# Patient Record
Sex: FEMALE | Race: OTHER
[De-identification: names, ages, dates, MRNs, and addresses within clinical notes are randomized per-mention and may not be internally consistent; named-entity substitution may affect disease eponyms.]

---

## 2018-04-18 ENCOUNTER — HOSPITAL ENCOUNTER (INPATIENT)
Dept: HOSPITAL 72 - EMR | Age: 83
LOS: 2 days | Discharge: HOME | DRG: 206 | End: 2018-04-20
Payer: MEDICARE

## 2018-04-18 VITALS — DIASTOLIC BLOOD PRESSURE: 68 MMHG | SYSTOLIC BLOOD PRESSURE: 139 MMHG

## 2018-04-18 VITALS — SYSTOLIC BLOOD PRESSURE: 142 MMHG | DIASTOLIC BLOOD PRESSURE: 65 MMHG

## 2018-04-18 VITALS — BODY MASS INDEX: 25.99 KG/M2 | WEIGHT: 156 LBS | HEIGHT: 65 IN

## 2018-04-18 VITALS — DIASTOLIC BLOOD PRESSURE: 50 MMHG | SYSTOLIC BLOOD PRESSURE: 143 MMHG

## 2018-04-18 VITALS — DIASTOLIC BLOOD PRESSURE: 63 MMHG | SYSTOLIC BLOOD PRESSURE: 135 MMHG

## 2018-04-18 VITALS — DIASTOLIC BLOOD PRESSURE: 67 MMHG | SYSTOLIC BLOOD PRESSURE: 139 MMHG

## 2018-04-18 DIAGNOSIS — Z88.6: ICD-10-CM

## 2018-04-18 DIAGNOSIS — E66.9: ICD-10-CM

## 2018-04-18 DIAGNOSIS — M94.0: Primary | ICD-10-CM

## 2018-04-18 DIAGNOSIS — Z86.73: ICD-10-CM

## 2018-04-18 DIAGNOSIS — K21.9: ICD-10-CM

## 2018-04-18 DIAGNOSIS — I10: ICD-10-CM

## 2018-04-18 DIAGNOSIS — Z96.653: ICD-10-CM

## 2018-04-18 DIAGNOSIS — R07.89: ICD-10-CM

## 2018-04-18 DIAGNOSIS — C91.10: ICD-10-CM

## 2018-04-18 DIAGNOSIS — R42: ICD-10-CM

## 2018-04-18 DIAGNOSIS — R91.8: ICD-10-CM

## 2018-04-18 DIAGNOSIS — F41.9: ICD-10-CM

## 2018-04-18 LAB
ADD MANUAL DIFF: YES
ALBUMIN SERPL-MCNC: 3.6 G/DL (ref 3.4–5)
ALBUMIN/GLOB SERPL: 1.1 {RATIO} (ref 1–2.7)
ALP SERPL-CCNC: 54 U/L (ref 46–116)
ALT SERPL-CCNC: 28 U/L (ref 12–78)
ANION GAP SERPL CALC-SCNC: 7 MMOL/L (ref 5–15)
AST SERPL-CCNC: 20 U/L (ref 15–37)
BILIRUB SERPL-MCNC: 0.5 MG/DL (ref 0.2–1)
BUN SERPL-MCNC: 26 MG/DL (ref 7–18)
CALCIUM SERPL-MCNC: 9 MG/DL (ref 8.5–10.1)
CHLORIDE SERPL-SCNC: 101 MMOL/L (ref 98–107)
CK MB SERPL-MCNC: < 0.5 NG/ML (ref 0–3.6)
CK SERPL-CCNC: 30 U/L (ref 26–308)
CO2 SERPL-SCNC: 30 MMOL/L (ref 21–32)
CREAT SERPL-MCNC: 0.9 MG/DL (ref 0.55–1.3)
ERYTHROCYTE [DISTWIDTH] IN BLOOD BY AUTOMATED COUNT: 10.7 % (ref 11.6–14.8)
GLOBULIN SER-MCNC: 3.3 G/DL
HCT VFR BLD CALC: 33.4 % (ref 37–47)
HGB BLD-MCNC: 12.1 G/DL (ref 12–16)
MCV RBC AUTO: 93 FL (ref 80–99)
PLATELET # BLD: 154 K/UL (ref 150–450)
POTASSIUM SERPL-SCNC: 3.4 MMOL/L (ref 3.5–5.1)
RBC # BLD AUTO: 3.6 M/UL (ref 4.2–5.4)
SODIUM SERPL-SCNC: 138 MMOL/L (ref 136–145)
WBC # BLD AUTO: 10.8 K/UL (ref 4.8–10.8)

## 2018-04-18 PROCEDURE — 84484 ASSAY OF TROPONIN QUANT: CPT

## 2018-04-18 PROCEDURE — 82550 ASSAY OF CK (CPK): CPT

## 2018-04-18 PROCEDURE — 85730 THROMBOPLASTIN TIME PARTIAL: CPT

## 2018-04-18 PROCEDURE — 82553 CREATINE MB FRACTION: CPT

## 2018-04-18 PROCEDURE — 93005 ELECTROCARDIOGRAM TRACING: CPT

## 2018-04-18 PROCEDURE — 83880 ASSAY OF NATRIURETIC PEPTIDE: CPT

## 2018-04-18 PROCEDURE — 80053 COMPREHEN METABOLIC PANEL: CPT

## 2018-04-18 PROCEDURE — 80061 LIPID PANEL: CPT

## 2018-04-18 PROCEDURE — 93880 EXTRACRANIAL BILAT STUDY: CPT

## 2018-04-18 PROCEDURE — 94760 N-INVAS EAR/PLS OXIMETRY 1: CPT

## 2018-04-18 PROCEDURE — 36415 COLL VENOUS BLD VENIPUNCTURE: CPT

## 2018-04-18 PROCEDURE — 84443 ASSAY THYROID STIM HORMONE: CPT

## 2018-04-18 PROCEDURE — 85025 COMPLETE CBC W/AUTO DIFF WBC: CPT

## 2018-04-18 PROCEDURE — 71045 X-RAY EXAM CHEST 1 VIEW: CPT

## 2018-04-18 PROCEDURE — 99285 EMERGENCY DEPT VISIT HI MDM: CPT

## 2018-04-18 PROCEDURE — 94664 DEMO&/EVAL PT USE INHALER: CPT

## 2018-04-18 PROCEDURE — 85610 PROTHROMBIN TIME: CPT

## 2018-04-18 PROCEDURE — 86140 C-REACTIVE PROTEIN: CPT

## 2018-04-18 PROCEDURE — 80048 BASIC METABOLIC PNL TOTAL CA: CPT

## 2018-04-18 PROCEDURE — 71260 CT THORAX DX C+: CPT

## 2018-04-18 PROCEDURE — 85007 BL SMEAR W/DIFF WBC COUNT: CPT

## 2018-04-18 PROCEDURE — 83690 ASSAY OF LIPASE: CPT

## 2018-04-18 PROCEDURE — 93306 TTE W/DOPPLER COMPLETE: CPT

## 2018-04-18 RX ADMIN — MORPHINE SULFATE PRN MG: 2 INJECTION, SOLUTION INTRAMUSCULAR; INTRAVENOUS at 20:07

## 2018-04-18 RX ADMIN — HEPARIN SODIUM SCH UNITS: 5000 INJECTION INTRAVENOUS; SUBCUTANEOUS at 20:42

## 2018-04-18 NOTE — DIAGNOSTIC IMAGING REPORT
Indication: Cough

 

Technique: One view of the chest

 

Comparison: none

 

Findings: At least 2 subcentimeter nodular opacities are seen in the right lung base.

Lungs and pleural spaces are otherwise clear. Heart size is normal

 

Impression: Right basilar subcentimeter nodular opacities, likely postinflammatory

but neoplasm not completely excludable. Consider further evaluation with CT scan if

clinically indicated

 

No acute process otherwise

## 2018-04-18 NOTE — CARDIOLOGY PROGRESS NOTE
Assessment/Plan


Assessment/Plan


ekg unremarkable 


tropnoted cxr neg 


awiatr echo and trop tomorrow 


full note to follow





Objective





Last 24 Hour Vital Signs








  Date Time  Temp Pulse Resp B/P (MAP) Pulse Ox O2 Delivery O2 Flow Rate FiO2


 


4/18/18 19:59  65 18   Room Air  


 


4/18/18 17:50 98.5 62 12 139/68 100 Nasal Cannula 2.0 





 98.4       


 


4/18/18 17:00  62 12 139/68 100 Nasal Cannula 2.0 


 


4/18/18 16:00  61 18 135/63 100 Room Air  


 


4/18/18 15:00  67 16 143/50 98 Room Air  


 


4/18/18 14:00  62 16 142/65 98 Room Air  


 


4/18/18 13:30      Room Air  


 


4/18/18 13:22 98.5 75 18 111/56 98 Room Air  





 98.4       











Laboratory Tests








Test


  4/18/18


13:46 4/18/18


20:00


 


White Blood Count


  10.8 K/UL


(4.8-10.8) 


 


 


Red Blood Count


  3.60 M/UL


(4.20-5.40)  L 


 


 


Hemoglobin


  12.1 G/DL


(12.0-16.0) 


 


 


Hematocrit


  33.4 %


(37.0-47.0)  L 


 


 


Mean Corpuscular Volume 93 FL (80-99)   


 


Mean Corpuscular Hemoglobin


  33.5 PG


(27.0-31.0)  H 


 


 


Mean Corpuscular Hemoglobin


Concent 36.1 G/DL


(32.0-36.0)  H 


 


 


Red Cell Distribution Width


  10.7 %


(11.6-14.8)  L 


 


 


Platelet Count


  154 K/UL


(150-450) 


 


 


Mean Platelet Volume


  8.4 FL


(6.5-10.1) 


 


 


Neutrophils (%) (Auto)


  % (45.0-75.0)


  


 


 


Lymphocytes (%) (Auto)


  % (20.0-45.0)


  


 


 


Monocytes (%) (Auto)  % (1.0-10.0)   


 


Eosinophils (%) (Auto)  % (0.0-3.0)   


 


Basophils (%) (Auto)  % (0.0-2.0)   


 


Differential Total Cells


Counted 100  


  


 


 


Neutrophils % (Manual) 25 % (45-75)  L 


 


Lymphocytes % (Manual) 70 % (20-45)  H 


 


Monocytes % (Manual) 4 % (1-10)   


 


Eosinophils % (Manual) 1 % (0-3)   


 


Basophils % (Manual) 0 % (0-2)   


 


Band Neutrophils 0 % (0-8)   


 


Reactive Lymphocytes 1+   


 


Platelet Estimate Adequate   


 


Platelet Morphology Normal   


 


Red Blood Cell Morphology Normal   


 


Sodium Level


  138 MMOL/L


(136-145) 


 


 


Potassium Level


  3.4 MMOL/L


(3.5-5.1)  L 


 


 


Chloride Level


  101 MMOL/L


() 


 


 


Carbon Dioxide Level


  30 MMOL/L


(21-32) 


 


 


Anion Gap


  7 mmol/L


(5-15) 


 


 


Blood Urea Nitrogen


  26 mg/dL


(7-18)  H 


 


 


Creatinine


  0.9 MG/DL


(0.55-1.30) 


 


 


Estimat Glomerular Filtration


Rate  mL/min (>60)  


  


 


 


Glucose Level


  109 MG/DL


()  H 


 


 


Calcium Level


  9.0 MG/DL


(8.5-10.1) 


 


 


Total Bilirubin


  0.5 MG/DL


(0.2-1.0) 


 


 


Aspartate Amino Transf


(AST/SGOT) 20 U/L (15-37)


  


 


 


Alanine Aminotransferase


(ALT/SGPT) 28 U/L (12-78)


  


 


 


Alkaline Phosphatase


  54 U/L


() 


 


 


Total Creatine Kinase


  30 U/L


() 


 


 


Creatine Kinase MB


  < 0.5 NG/ML


(0.0-3.6) 


 


 


Creatine Kinase MB Relative


Index 1.6  


  


 


 


Troponin I


  0.000 ng/mL


(0.000-0.056) Pending  


 


 


Pro-B-Type Natriuretic Peptide


  362 pg/mL


(0-125)  H 


 


 


Total Protein


  6.9 G/DL


(6.4-8.2) 


 


 


Albumin


  3.6 G/DL


(3.4-5.0) 


 


 


Globulin 3.3 g/dL   


 


Albumin/Globulin Ratio 1.1 (1.0-2.7)   


 


Lipase


  321 U/L


() 


 

















MATT CASTLE Apr 18, 2018 20:35

## 2018-04-18 NOTE — EMERGENCY ROOM REPORT
History of Present Illness


General


Chief Complaint:  Chest Pain


Source:  Patient





Present Illness


HPI


Patient presents to the emergency department today complaining of one day of 

worsening chest discomfort.  It is described as dull achy Associates, shortness 

of breath.  Radiates into her left shoulder.  She denies any leg pain or leg 

swelling.  Symptoms noted to be moderate to severe.  Patient does not recall 

recent cardiac study.No other modifying factors.  No other associated signs and 

symptoms.  No other complaints were noted.


Allergies:  


Coded Allergies:  


     IBUPROFEN (Verified  Allergy, Unknown, 4/18/18)





Patient History


Past Medical History:  HTN


Past Surgical History:  none


Pertinent Family History:  none


Social History:  Denies: smoking, alcohol use, drug use


Last Menstrual Period:  na


Reviewed Nursing Documentation:  PMH: Agreed; PSxH: Agreed





Nursing Documentation-PMH


Past Medical History:  No History, Except For


Hx Hypertension:  Yes





Review of Systems


All Other Systems:  negative except mentioned in HPI





Physical Exam





Vital Signs








  Date Time  Temp Pulse Resp B/P (MAP) Pulse Ox O2 Delivery O2 Flow Rate FiO2


 


4/18/18 13:22 98.5 75 18 111/56 98 Room Air  





 98.4       








Sp02 EP Interpretation:  reviewed, normal


General Appearance:  normal inspection, well appearing, no apparent distress, 

alert


Head:  atraumatic


Eyes:  bilateral eye normal inspection


ENT:  normal ENT inspection, hearing grossly normal, normal voice


Neck:  normal inspection, full range of motion, supple, no bony tend


Respiratory:  normal inspection, lungs clear, normal breath sounds, no 

respiratory distress, no retraction, no wheezing


Cardiovascular #1:  regular rate, rhythm, no edema


Gastrointestinal:  normal inspection, normal bowel sounds, non tender, soft, no 

guarding, no hernia


Genitourinary:  no CVA tenderness


Musculoskeletal:  normal inspection, back normal, normal range of motion


Neurologic:  normal inspection, alert, responsive, speech normal


Psychiatric:  normal inspection, judgement/insight normal, mood/affect normal


Skin:  normal inspection, normal color, no rash





Medical Decision Making


Diagnostic Impression:  


 Primary Impression:  


 Chest pain


 Additional Impression:  


 ACS (acute coronary syndrome)


ER Course


Patient presented to the emergency department today complaining of chest pain.  

Differential diagnoses include acute coronary syndrome, pulmonary embolism, 

pneumothorax, chest wall pain, pleurisy, pericarditis, acute anxiety reaction 

just to name a few. Given the severity of the patient's presentation I felt 

this is a highly complex patient.  This patient required extensive workup.  CBC

, chemistry, EKG, chest x-ray, cardiac enzymes, liver profile were all obtained.





12-lead EKG performed for nontraumatic chest pain.  RS documentation:





EKG was performed.  Please refer to below for interpretation.





Patient's laboratory workup was negative.  Given patient's symptoms and her 

risk factors felt the patient required admission.  Case discussed with Dr. Sergio Macedo for admission.  Patient will be admitted to telemetry for further 

treatment.





Labs








Test


  4/18/18


13:46


 


White Blood Count


  10.8 K/UL


(4.8-10.8)


 


Red Blood Count


  3.60 M/UL


(4.20-5.40)


 


Hemoglobin


  12.1 G/DL


(12.0-16.0)


 


Hematocrit


  33.4 %


(37.0-47.0)


 


Mean Corpuscular Volume 93 FL (80-99) 


 


Mean Corpuscular Hemoglobin


  33.5 PG


(27.0-31.0)


 


Mean Corpuscular Hemoglobin


Concent 36.1 G/DL


(32.0-36.0)


 


Red Cell Distribution Width


  10.7 %


(11.6-14.8)


 


Platelet Count


  154 K/UL


(150-450)


 


Mean Platelet Volume


  8.4 FL


(6.5-10.1)


 


Neutrophils (%) (Auto)  % (45.0-75.0) 


 


Lymphocytes (%) (Auto)  % (20.0-45.0) 


 


Monocytes (%) (Auto)  % (1.0-10.0) 


 


Eosinophils (%) (Auto)  % (0.0-3.0) 


 


Basophils (%) (Auto)  % (0.0-2.0) 


 


Differential Total Cells


Counted 100 


 


 


Neutrophils % (Manual) 25 % (45-75) 


 


Lymphocytes % (Manual) 70 % (20-45) 


 


Monocytes % (Manual) 4 % (1-10) 


 


Eosinophils % (Manual) 1 % (0-3) 


 


Basophils % (Manual) 0 % (0-2) 


 


Band Neutrophils 0 % (0-8) 


 


Reactive Lymphocytes 1+ 


 


Platelet Estimate Adequate 


 


Platelet Morphology Normal 


 


Red Blood Cell Morphology Normal 


 


Sodium Level


  138 MMOL/L


(136-145)


 


Potassium Level


  3.4 MMOL/L


(3.5-5.1)


 


Chloride Level


  101 MMOL/L


()


 


Carbon Dioxide Level


  30 MMOL/L


(21-32)


 


Anion Gap


  7 mmol/L


(5-15)


 


Blood Urea Nitrogen


  26 mg/dL


(7-18)


 


Creatinine


  0.9 MG/DL


(0.55-1.30)


 


Estimat Glomerular Filtration


Rate  mL/min (>60) 


 


 


Glucose Level


  109 MG/DL


()


 


Calcium Level


  9.0 MG/DL


(8.5-10.1)


 


Total Bilirubin


  0.5 MG/DL


(0.2-1.0)


 


Aspartate Amino Transf


(AST/SGOT) 20 U/L (15-37) 


 


 


Alanine Aminotransferase


(ALT/SGPT) 28 U/L (12-78) 


 


 


Alkaline Phosphatase


  54 U/L


()


 


Total Creatine Kinase


  30 U/L


()


 


Creatine Kinase MB


  < 0.5 NG/ML


(0.0-3.6)


 


Creatine Kinase MB Relative


Index 1.6 


 


 


Troponin I


  0.000 ng/mL


(0.000-0.056)


 


Pro-B-Type Natriuretic Peptide


  362 pg/mL


(0-125)


 


Total Protein


  6.9 G/DL


(6.4-8.2)


 


Albumin


  3.6 G/DL


(3.4-5.0)


 


Globulin 3.3 g/dL 


 


Albumin/Globulin Ratio 1.1 (1.0-2.7) 


 


Lipase


  321 U/L


()








EKG Diagnostic Results


Rate:  normal


Rhythm:  NSR


ST Segments:  no acute changes





Rhythm Strip Diag. Results


EP Interpretation:  yes


Rate:  65


Rhythm:  NSR, no PVC's, no ectopy





Chest X-Ray Diagnostic Results


Chest X-Ray Diagnostic Results :  


   Chest X-Ray Ordered:  Yes


   # of Views/Limited/Complete:  1 View


   Indication:  Chest Pain


   EP Interpretation:  No


   Interpretation:  other - possible pulmonary nodules





Last Vital Signs








  Date Time  Temp Pulse Resp B/P (MAP) Pulse Ox O2 Delivery O2 Flow Rate FiO2


 


4/18/18 16:00  61 18 135/63 100 Room Air  


 


4/18/18 13:22 98.5       





 98.4       








Status:  improved


Disposition:  ADMITTED AS INPATIENT


Condition:  Serious


Referrals:  


NON PHYSICIAN (PCP)











YANNICK GALAN M.D. Apr 18, 2018 16:41

## 2018-04-19 VITALS — SYSTOLIC BLOOD PRESSURE: 174 MMHG | DIASTOLIC BLOOD PRESSURE: 72 MMHG

## 2018-04-19 VITALS — SYSTOLIC BLOOD PRESSURE: 131 MMHG | DIASTOLIC BLOOD PRESSURE: 68 MMHG

## 2018-04-19 VITALS — DIASTOLIC BLOOD PRESSURE: 64 MMHG | SYSTOLIC BLOOD PRESSURE: 124 MMHG

## 2018-04-19 VITALS — SYSTOLIC BLOOD PRESSURE: 153 MMHG | DIASTOLIC BLOOD PRESSURE: 65 MMHG

## 2018-04-19 VITALS — DIASTOLIC BLOOD PRESSURE: 65 MMHG | SYSTOLIC BLOOD PRESSURE: 129 MMHG

## 2018-04-19 VITALS — DIASTOLIC BLOOD PRESSURE: 67 MMHG | SYSTOLIC BLOOD PRESSURE: 130 MMHG

## 2018-04-19 LAB
ADD MANUAL DIFF: YES
APTT BLD: 23 SEC (ref 23–33)
CHOLEST SERPL-MCNC: 225 MG/DL (ref ?–200)
ERYTHROCYTE [DISTWIDTH] IN BLOOD BY AUTOMATED COUNT: 11.1 % (ref 11.6–14.8)
HCT VFR BLD CALC: 36.7 % (ref 37–47)
HDLC SERPL-MCNC: 55 MG/DL (ref 40–60)
HGB BLD-MCNC: 12.4 G/DL (ref 12–16)
INR PPP: 1 (ref 0.9–1.1)
MCV RBC AUTO: 94 FL (ref 80–99)
PLATELET # BLD: 158 K/UL (ref 150–450)
RBC # BLD AUTO: 3.91 M/UL (ref 4.2–5.4)
TRIGL SERPL-MCNC: 94 MG/DL (ref 30–150)
WBC # BLD AUTO: 11.2 K/UL (ref 4.8–10.8)

## 2018-04-19 RX ADMIN — MORPHINE SULFATE PRN MG: 2 INJECTION, SOLUTION INTRAMUSCULAR; INTRAVENOUS at 06:56

## 2018-04-19 RX ADMIN — HEPARIN SODIUM SCH UNITS: 5000 INJECTION INTRAVENOUS; SUBCUTANEOUS at 21:16

## 2018-04-19 RX ADMIN — HEPARIN SODIUM SCH UNITS: 5000 INJECTION INTRAVENOUS; SUBCUTANEOUS at 08:45

## 2018-04-19 RX ADMIN — Medication SCH DROP: at 19:54

## 2018-04-19 RX ADMIN — BRIMONIDINE TARTRATE SCH DROP: 2 SOLUTION/ DROPS OPHTHALMIC at 21:14

## 2018-04-19 NOTE — HISTORY AND PHYSICAL REPORT
DATE OF ADMISSION:  04/18/2018



CHIEF COMPLAINT:  The patient is an 88-year-old white female who presents

with chief complaint of dizziness and chest pain.



HISTORY OF PRESENT ILLNESS:  Began one month ago.  The patient was admitted

to [facility].  The patient states she had "test done

there."



The patient states history of present illness began 3 days prior to

admission.  The patient began to experience dizziness.  The patient states

dizziness gets worse with change of head position.  The patient states on

04/18/2018, she began to experience left-sided chest pain.  Chest pain

radiates to the left axilla.  The patient states there are no alleviating

factors.



The patient presented to [facility] emergency room.  The patient was

admitted for chest pain to rule out acute coronary syndrome.



PAST MEDICAL HISTORY:  Significant for;



1. Hypertension.

2. History of cerebrovascular accident in 1990.



PAST SURGICAL HISTORY:  Significant for



1. Bilateral knee replacement.

2. Bilateral carpal tunnel release.

3. Cholecystectomy.



CURRENT MEDICATIONS:  Baclofen 20 mg p.o. 3 times daily.



ALLERGIES:  To Celebrex and tramadol.



SOCIAL HISTORY:  The patient is a  and lives alone.  The patient

denies tobacco or alcohol use.



REVIEW OF SYSTEMS:  CONSTITUTIONAL:  The patient denies weight loss or

weight gain.  The patient denies fevers or chills.  HEENT:  The patient

denies ear or throat pain.  The patient denies headache.  CARDIOVASCULAR:

The patient complains of chest pain as above.  The patient denies

palpitations.  CHEST:  The patient denies wheeze or shortness of breath.

ABDOMEN:  The patient denies nausea, vomiting, diarrhea, or constipation.

GENITOURINARY:  The patient denies dysuria or increased frequency

urination.  NEUROMUSCULAR:  The patient complains of vertigo as above.

The patient denies seizures or generalized weakness.



PHYSICAL EXAMINATION:

GENERAL:  The patient is well-developed and well-nourished white female, in

no apparent distress.

VITAL SIGNS:  Temperature 96.4 degrees, respirations 19, pulse 71, and

blood pressure 129/65.

HEENT:  Pupils equal and responsive to light and accommodation.

Extraocular movements are intact.

NECK:  Supple without lymphadenopathy.

CHEST:  Lungs are clear to auscultation bilaterally without wheezes or

rales.

CARDIOVASCULAR:  Regular _____02:41 rate.  S1 and S2 normal without

murmurs, rubs, or gallops.

ABDOMEN:  Soft, nontender, and nondistended.  Positive bowel sounds.  No

evidence of hepatosplenomegaly.  Currently, no rebound or guarding

noted.

EXTREMITIES:  Negative for clubbing, cyanosis, or edema.

RECTAL/GENITAL:  Deferred.

NEUROLOGIC:  Cranial nerves II through XII are grossly intact without focal

deficits.  Motor strength is 5/5 bilaterally.  Deep tendon reflexes are 2+

plantar.



LABORATORY AND DIAGNOSTIC DATA:  WBC 10.8, hemoglobin 12.1, hematocrit 33.4

and platelets 154,000.  Sodium 138, potassium 3.4, chloride 101, CO2 30,

BUN 26, creatinine 0.9 and glucose 109.  Troponin 0.0.  BNP elevated at

362.  A chest x-ray was reported as no acute disease.  An EKG demonstrated

normal sinus rhythm at approximately 65 beats per minute.  There are no

acute ST changes or Q-waves noted.



ASSESSMENT:  This is an 88-year-old white female



1. Vertigo.

2. Chest pain.

3. Hypertension.

4. History of cerebrovascular disease.



TREATMENT:

1. Chest pain.  A Cardiology consultation will be obtained with Dr. Amilcar Bell.  A Cardiolite stress test is pending.  A CT scan of the

chest is pending.  We will follow recommendation of Cardiology.

2. Vertigo.  The patient is currently being offered meclizine p.r.n. for

vertigo.

3. Hypertension.  The patient is currently normotensive, off

medication.

4. Cerebrovascular disease.









  ______________________________________________

  Derian Cheung M.D.





DR:  ARGELIA

D:  04/19/2018 16:55

T:  04/19/2018 22:03

JOB#:  [id number]

CC:

## 2018-04-19 NOTE — CARDIOLOGY PROGRESS NOTE
Assessment/Plan


Assessment/Plan


cp reproducible on palpation of he deltopectoral groove 


cll 


multiple pulm nodule on ct a t [place] unchanged since 2013 ct per ced[facility] report 


obesity 


djd 


htn 











all torp neg 


ekg neg unchanged form prior 


pain on palpation 


echo no swma 





ok to dc home with out pt fu


[id number]





Objective





Last 24 Hour Vital Signs








  Date Time  Temp Pulse Resp B/P (MAP) Pulse Ox O2 Delivery O2 Flow Rate FiO2


 


4/19/18 16:00 97.3 76 19 149/73 97 Nasal Cannula 2.0 





 97.3 76      


 


4/19/18 15:47  76      


 


4/19/18 14:25  64 20   Nasal Cannula 2.0 


 


4/19/18 14:23      Nasal Cannula 2.0 


 


4/19/18 14:22     97 Nasal Cannula 2.0 


 


4/19/18 12:00 97.7 73 20 131/68 100 Nasal Cannula 2.0 





 97.7 73      


 


4/19/18 12:00  60      


 


4/19/18 08:00  68      


 


4/19/18 08:00 97.0 66 19 124/64 100 Nasal Cannula 2.0 





 97.0 66      


 


4/19/18 04:00 96.1 66 16 130/67 99 Nasal Cannula 2.0 





 96.1       


 


4/19/18 04:00  61      


 


4/19/18 00:00  57      


 


4/19/18 00:00 96.4 71 19 129/65 100 Nasal Cannula 2.0 





 96.4       


 


4/18/18 20:00 97.3 79 18 139/67 98 Nasal Cannula 2.0 





 97.3       


 


4/18/18 20:00  70      


 


4/18/18 19:59  65 18   Room Air  

















Intake and Output  


 


 4/18/18 4/19/18





 19:00 07:00


 


Intake Total  420 ml


 


Output Total 100 ml 300 ml


 


Balance -100 ml 120 ml


 


  


 


Intake Oral  420 ml


 


Output Urine Total 100 ml 300 ml


 


# Voids 1 











Laboratory Tests








Test


  4/18/18


20:00 4/19/18


08:15


 


Troponin I


  0.000 ng/mL


(0.000-0.056) 0.000 ng/mL


(0.000-0.056)


 


White Blood Count


  


  11.2 K/UL


(4.8-10.8)  H


 


Red Blood Count


  


  3.91 M/UL


(4.20-5.40)  L


 


Hemoglobin


  


  12.4 G/DL


(12.0-16.0)


 


Hematocrit


  


  36.7 %


(37.0-47.0)  L


 


Mean Corpuscular Volume  94 FL (80-99)  


 


Mean Corpuscular Hemoglobin


  


  31.9 PG


(27.0-31.0)  H


 


Mean Corpuscular Hemoglobin


Concent 


  33.9 G/DL


(32.0-36.0)


 


Red Cell Distribution Width


  


  11.1 %


(11.6-14.8)  L


 


Platelet Count


  


  158 K/UL


(150-450)


 


Mean Platelet Volume


  


  8.0 FL


(6.5-10.1)


 


Neutrophils (%) (Auto)


  


  % (45.0-75.0)


 


 


Lymphocytes (%) (Auto)


  


  % (20.0-45.0)


 


 


Monocytes (%) (Auto)   % (1.0-10.0)  


 


Eosinophils (%) (Auto)   % (0.0-3.0)  


 


Basophils (%) (Auto)   % (0.0-2.0)  


 


Differential Total Cells


Counted 


  100  


 


 


Neutrophils % (Manual)  23 % (45-75)  L


 


Lymphocytes % (Manual)  71 % (20-45)  H


 


Monocytes % (Manual)  5 % (1-10)  


 


Eosinophils % (Manual)  1 % (0-3)  


 


Basophils % (Manual)  0 % (0-2)  


 


Band Neutrophils  0 % (0-8)  


 


Reactive Lymphocytes  1+  


 


Platelet Estimate  Adequate  


 


Platelet Morphology  Normal  


 


Red Blood Cell Morphology  Normal  


 


Prothrombin Time


  


  10.4 SEC


(9.30-11.50)


 


Prothromb Time International


Ratio 


  1.0 (0.9-1.1)  


 


 


Activated Partial


Thromboplast Time 


  23 SEC (23-33)


 


 


C-Reactive Protein,


Quantitative 


  < 0.4 mg/dL


(0.00-0.90)


 


Triglycerides Level


  


  94 MG/DL


()


 


Cholesterol Level


  


  225 MG/DL (<


200)  H


 


LDL Cholesterol


  


  147 mg/dL


(<100)  H


 


HDL Cholesterol


  


  55 MG/DL


(40-60)


 


Cholesterol/HDL Ratio  4.1 (3.3-4.4)  


 


Thyroid Stimulating Hormone


(TSH) 


  0.595 uiU/mL


(0.358-3.740)

















MATT CASTLE Apr 19, 2018 20:00

## 2018-04-19 NOTE — CONSULTATION
History of Present Illness


General


Date patient seen:  Apr 19, 2018


Chief Complaint:  Chest Pain


Referring physician:  Dr. Cheung


Reason for Consultation:  pulmonary nodule





Present Illness


HPI





88 year of female with hx of HTN presented to the emergency department 

complaining of chest discomfort.  It is described as dull achy, shortness of 

breath.  Radiates into her left shoulder.  She denies any leg pain or leg 

swelling.  Symptoms noted to be moderate to severe. she is admitted to 

telemetry to rule out ACS.  Her cxr showed subcentimeter nodules in sub hilar 

area.


Allergies:  


Coded Allergies:  


     IBUPROFEN (Verified  Allergy, Unknown, 4/18/18)





Medication History


Scheduled


Baclofen (Baclofen), 20 MG ORAL THREE TIMES A DAY, (Reported)





Patient History


Healthcare decision maker


N


Resuscitation status





Advanced Directive on File








Past Medical/Surgical History


Past Medical/Surgical History:  


(1) History of hypertension





Review of Systems


Eye:  Reports: no symptoms


ENT:  Reports: no symptoms


Cardiovascular:  Reports: chest pain


All Other Systems:  negative except mentioned in HPI





Physical Exam


General Appearance:  WD/WN


Lines, tubes and drains:  peripheral


HEENT:  normocephalic, atraumatic


Neck:  non-tender, normal alignment


Respiratory/Chest:  chest wall non-tender, lungs clear


Breasts:  no masses


Cardiovascular/Chest:  normal peripheral pulses


Abdomen:  normal bowel sounds, soft


Genitourinary/Rectal:  normal genital exam


Extremities:  normal range of motion


Skin Exam:  normal pigmentation





Last 24 Hour Vital Signs








  Date Time  Temp Pulse Resp B/P (MAP) Pulse Ox O2 Delivery O2 Flow Rate FiO2


 


4/19/18 08:00  68      


 


4/19/18 08:00 97.0 66 19 124/64 100 Nasal Cannula 2.0 





 97.0 66      


 


4/19/18 04:00 96.1 66 16 130/67 99 Nasal Cannula 2.0 





 96.1       


 


4/19/18 04:00  61      


 


4/19/18 00:00  57      


 


4/19/18 00:00 96.4 71 19 129/65 100 Nasal Cannula 2.0 





 96.4       


 


4/18/18 20:00 97.3 79 18 139/67 98 Nasal Cannula 2.0 





 97.3       


 


4/18/18 20:00  70      


 


4/18/18 19:59  65 18   Room Air  


 


4/18/18 17:50 98.5 62 12 139/68 100 Nasal Cannula 2.0 





 98.4       


 


4/18/18 17:00  62 12 139/68 100 Nasal Cannula 2.0 


 


4/18/18 16:00  61 18 135/63 100 Room Air  


 


4/18/18 15:00  67 16 143/50 98 Room Air  


 


4/18/18 14:00  62 16 142/65 98 Room Air  


 


4/18/18 13:30      Room Air  


 


4/18/18 13:22 98.5 75 18 111/56 98 Room Air  





 98.4       

















Intake and Output  


 


 4/18/18 4/19/18





 19:00 07:00


 


Intake Total  420 ml


 


Output Total 100 ml 300 ml


 


Balance -100 ml 120 ml


 


  


 


Intake Oral  420 ml


 


Output Urine Total 100 ml 300 ml


 


# Voids 1 











Laboratory Tests








Test


  4/18/18


13:46 4/18/18


20:00 4/19/18


08:15


 


White Blood Count


  10.8 K/UL


(4.8-10.8) 


  11.2 K/UL


(4.8-10.8)  H


 


Red Blood Count


  3.60 M/UL


(4.20-5.40)  L 


  3.91 M/UL


(4.20-5.40)  L


 


Hemoglobin


  12.1 G/DL


(12.0-16.0) 


  12.4 G/DL


(12.0-16.0)


 


Hematocrit


  33.4 %


(37.0-47.0)  L 


  36.7 %


(37.0-47.0)  L


 


Mean Corpuscular Volume 93 FL (80-99)    94 FL (80-99)  


 


Mean Corpuscular Hemoglobin


  33.5 PG


(27.0-31.0)  H 


  31.9 PG


(27.0-31.0)  H


 


Mean Corpuscular Hemoglobin


Concent 36.1 G/DL


(32.0-36.0)  H 


  33.9 G/DL


(32.0-36.0)


 


Red Cell Distribution Width


  10.7 %


(11.6-14.8)  L 


  11.1 %


(11.6-14.8)  L


 


Platelet Count


  154 K/UL


(150-450) 


  158 K/UL


(150-450)


 


Mean Platelet Volume


  8.4 FL


(6.5-10.1) 


  8.0 FL


(6.5-10.1)


 


Neutrophils (%) (Auto)


  % (45.0-75.0)


  


  % (45.0-75.0)


 


 


Lymphocytes (%) (Auto)


  % (20.0-45.0)


  


  % (20.0-45.0)


 


 


Monocytes (%) (Auto)  % (1.0-10.0)     % (1.0-10.0)  


 


Eosinophils (%) (Auto)  % (0.0-3.0)     % (0.0-3.0)  


 


Basophils (%) (Auto)  % (0.0-2.0)     % (0.0-2.0)  


 


Differential Total Cells


Counted 100  


  


  100  


 


 


Neutrophils % (Manual) 25 % (45-75)  L  23 % (45-75)  L


 


Lymphocytes % (Manual) 70 % (20-45)  H  71 % (20-45)  H


 


Monocytes % (Manual) 4 % (1-10)    5 % (1-10)  


 


Eosinophils % (Manual) 1 % (0-3)    1 % (0-3)  


 


Basophils % (Manual) 0 % (0-2)    0 % (0-2)  


 


Band Neutrophils 0 % (0-8)    0 % (0-8)  


 


Reactive Lymphocytes 1+    1+  


 


Platelet Estimate Adequate    Adequate  


 


Platelet Morphology Normal    Normal  


 


Red Blood Cell Morphology Normal    Normal  


 


Sodium Level


  138 MMOL/L


(136-145) 


  


 


 


Potassium Level


  3.4 MMOL/L


(3.5-5.1)  L 


  


 


 


Chloride Level


  101 MMOL/L


() 


  


 


 


Carbon Dioxide Level


  30 MMOL/L


(21-32) 


  


 


 


Anion Gap


  7 mmol/L


(5-15) 


  


 


 


Blood Urea Nitrogen


  26 mg/dL


(7-18)  H 


  


 


 


Creatinine


  0.9 MG/DL


(0.55-1.30) 


  


 


 


Estimat Glomerular Filtration


Rate  mL/min (>60)  


  


  


 


 


Glucose Level


  109 MG/DL


()  H 


  


 


 


Calcium Level


  9.0 MG/DL


(8.5-10.1) 


  


 


 


Total Bilirubin


  0.5 MG/DL


(0.2-1.0) 


  


 


 


Aspartate Amino Transf


(AST/SGOT) 20 U/L (15-37)


  


  


 


 


Alanine Aminotransferase


(ALT/SGPT) 28 U/L (12-78)


  


  


 


 


Alkaline Phosphatase


  54 U/L


() 


  


 


 


Total Creatine Kinase


  30 U/L


() 


  


 


 


Creatine Kinase MB


  < 0.5 NG/ML


(0.0-3.6) 


  


 


 


Creatine Kinase MB Relative


Index 1.6  


  


  


 


 


Troponin I


  0.000 ng/mL


(0.000-0.056) 0.000 ng/mL


(0.000-0.056) 0.000 ng/mL


(0.000-0.056)


 


Pro-B-Type Natriuretic Peptide


  362 pg/mL


(0-125)  H 


  


 


 


Total Protein


  6.9 G/DL


(6.4-8.2) 


  


 


 


Albumin


  3.6 G/DL


(3.4-5.0) 


  


 


 


Globulin 3.3 g/dL    


 


Albumin/Globulin Ratio 1.1 (1.0-2.7)    


 


Lipase


  321 U/L


() 


  


 


 


Prothrombin Time


  


  


  10.4 SEC


(9.30-11.50)


 


Prothromb Time International


Ratio 


  


  1.0 (0.9-1.1)  


 


 


Activated Partial


Thromboplast Time 


  


  23 SEC (23-33)


 


 


C-Reactive Protein,


Quantitative 


  


  < 0.4 mg/dL


(0.00-0.90)


 


Triglycerides Level


  


  


  94 MG/DL


()


 


Cholesterol Level


  


  


  225 MG/DL (<


200)  H


 


LDL Cholesterol


  


  


  147 mg/dL


(<100)  H


 


HDL Cholesterol


  


  


  55 MG/DL


(40-60)


 


Cholesterol/HDL Ratio   4.1 (3.3-4.4)  


 


Thyroid Stimulating Hormone


(TSH) 


  


  0.595 uiU/mL


(0.358-3.740)








Height (Feet):  5


Height (Inches):  5.00


Weight (Pounds):  156


Medications





Current Medications








 Medications


  (Trade)  Dose


 Ordered  Sig/Ludin


 Route


 PRN Reason  Start Time


 Stop Time Status Last Admin


Dose Admin


 


 Acetaminophen


  (Tylenol)  650 mg  Q4H  PRN


 ORAL


 FEVER (temp>100.5F)  4/18/18 17:15


 5/18/18 17:14   


 


 


 Albuterol/


 Ipratropium


  (Albuterol/


 Ipratropium)  3 ml  Q4H  PRN


 HHN


 Shortness of Breath  4/18/18 17:15


 4/23/18 17:14   


 


 


 Diltiazem HCl


  (Cardizem)  10 mg  Q1H  PRN


 IV


 heart rate more than 120,   4/18/18 17:15


 5/18/18 17:14   


 


 


 Enalaprilat


  (Vasotec)  2.5 mg  Q6H  PRN


 IV


 sbp more than 160  4/18/18 17:15


 5/18/18 17:14   


 


 


 Heparin Sodium


  (Porcine)


  (Heparin 5000


 units/ml)  5,000 units  EVERY 12  HOURS


 SUBQ


   4/18/18 21:00


 5/18/18 20:59  4/19/18 08:45


 


 


 Morphine Sulfate


  (Morphine


 Sulfate)  2 mg  Q4H  PRN


 IVP


 Severe Pain (Pain Scale 7-10)  4/19/18 07:00


 4/25/18 17:14   


 


 


 Nitroglycerin


  (Ntg)  0.4 mg  Q5M  PRN


 SL


 Prn Chest Pain  4/18/18 17:15


 5/18/18 17:14   


 


 


 Ondansetron HCl


  (Zofran)  4 mg  Q6H  PRN


 IVP


 Nausea & Vomiting  4/18/18 17:15


 5/18/18 17:14   


 


 


 Polyethylene


 Glycol


  (Miralax)  17 gm  DAILYPRN  PRN


 ORAL


 Constipation  4/18/18 17:15


 5/18/18 17:14   


 


 


 Temazepam


  (Restoril)  15 mg  HSPRN  PRN


 ORAL


 Insomnia  4/18/18 17:15


 4/25/18 17:14   


 











Assessment/Plan


Problem List:  


(1) ACS (acute coronary syndrome)


ICD Codes:  I24.9 - Acute ischemic heart disease, unspecified


SNOMED:  265611476


(2) Pulmonary nodule


ICD Codes:  R91.1 - Solitary pulmonary nodule


SNOMED:  671744219


(3) Chest pain


ICD Codes:  R07.9 - Chest pain, unspecified


SNOMED:  87012443


(4) History of hypertension


ICD Codes:  Z86.79 - Personal history of other diseases of the circulatory 

system


SNOMED:  406621662


Assessment/Plan


serial ekg, tronopnin


Echo


symptomatic treatment


CT of chest with contrast if pt agrees.











Thelma Bryson MD Apr 19, 2018 12:01

## 2018-04-19 NOTE — CARDIOLOGY REPORT
--------------- APPROVED REPORT --------------





EKG Measurement

Heart [id number]

[id number]

[id number]

[id number]

[id number]





Normal sinus rhythm with sinus arrhythmia

Normal ECG

## 2018-04-19 NOTE — HISTORY & PHYSICAL
History and Physical


History & Physicial


Dictated for Int Med -Dr Macedo no. [id number].











JOSHUA RAMIREZ Apr 19, 2018 16:56

## 2018-04-19 NOTE — CONSULTATION
DATE OF CONSULTATION:  04/19/2018



CONSULTING PHYSICIAN:  Alejandro Bell M.D.



HISTORY:  The patient is an 88-year-old female with a history of multiple

medical problems including hypertension, chest pain, pulmonary nodules,

acute coronary syndrome, and anxiety, who has been admitted to the

hospital for chest pain.  Psychiatry was consulted as the patient

presenting with anxiety.  During the evaluation, her daughter and

son-in-law also were present.  The patient having anxiety every day, most

of the day.  She denied any depressive symptoms, which include suicidal or

homicidal ideation.  No manic or psychotic symptoms.  She has cognitive

impairment including memory.



PAST PSYCHIATRIC HISTORY:  As above.



ALLERGIES:  Ibuprofen.



SUBSTANCE ABUSE HISTORY:  No history of illicit drug use or

alcohol.



MENTAL STATUS EXAMINATION:  The patient is alert and oriented times self,

place, and situation.  Mood is anxious.  Affect is constricted.  Congruent

with mood.  Thought process is concrete.  Thought content, no suicidal or

homicidal ideations.  No delusions.  Cognition is impaired, mild to

moderate.  Insight and judgment is fair.



ASSESSMENT:

AXIS I  Anxiety disorder.

AXIS II  Deferred.

AXIS III  As above.

AXIS IV  Low.



PLAN:

1. We will start the patient on Ativan p.r.n.

2. The son-in-law stated that he would like morphine to be stopped.  I

redirected her to speak to the charge nurse.









  ______________________________________________

  Alejandro Bell M.D.





DR:  ASH

D:  04/19/2018 13:08

T:  04/19/2018 21:04

JOB#:  [id number]

CC:

## 2018-04-20 VITALS — SYSTOLIC BLOOD PRESSURE: 99 MMHG | DIASTOLIC BLOOD PRESSURE: 51 MMHG

## 2018-04-20 VITALS — SYSTOLIC BLOOD PRESSURE: 97 MMHG | DIASTOLIC BLOOD PRESSURE: 40 MMHG

## 2018-04-20 VITALS — DIASTOLIC BLOOD PRESSURE: 48 MMHG | SYSTOLIC BLOOD PRESSURE: 81 MMHG

## 2018-04-20 VITALS — DIASTOLIC BLOOD PRESSURE: 66 MMHG | SYSTOLIC BLOOD PRESSURE: 105 MMHG

## 2018-04-20 VITALS — SYSTOLIC BLOOD PRESSURE: 107 MMHG | DIASTOLIC BLOOD PRESSURE: 49 MMHG

## 2018-04-20 LAB
ADD MANUAL DIFF: YES
ANION GAP SERPL CALC-SCNC: 8 MMOL/L (ref 5–15)
BUN SERPL-MCNC: 30 MG/DL (ref 7–18)
CALCIUM SERPL-MCNC: 8.9 MG/DL (ref 8.5–10.1)
CHLORIDE SERPL-SCNC: 100 MMOL/L (ref 98–107)
CO2 SERPL-SCNC: 30 MMOL/L (ref 21–32)
CREAT SERPL-MCNC: 1.3 MG/DL (ref 0.55–1.3)
ERYTHROCYTE [DISTWIDTH] IN BLOOD BY AUTOMATED COUNT: 11.2 % (ref 11.6–14.8)
HCT VFR BLD CALC: 33 % (ref 37–47)
HGB BLD-MCNC: 11.5 G/DL (ref 12–16)
MCV RBC AUTO: 94 FL (ref 80–99)
PLATELET # BLD: 131 K/UL (ref 150–450)
POTASSIUM SERPL-SCNC: 4.1 MMOL/L (ref 3.5–5.1)
RBC # BLD AUTO: 3.52 M/UL (ref 4.2–5.4)
SODIUM SERPL-SCNC: 138 MMOL/L (ref 136–145)
WBC # BLD AUTO: 10.7 K/UL (ref 4.8–10.8)

## 2018-04-20 RX ADMIN — BRIMONIDINE TARTRATE SCH DROP: 2 SOLUTION/ DROPS OPHTHALMIC at 14:28

## 2018-04-20 RX ADMIN — BRIMONIDINE TARTRATE SCH DROP: 2 SOLUTION/ DROPS OPHTHALMIC at 05:59

## 2018-04-20 RX ADMIN — HEPARIN SODIUM SCH UNITS: 5000 INJECTION INTRAVENOUS; SUBCUTANEOUS at 08:32

## 2018-04-20 RX ADMIN — Medication SCH DROP: at 08:27

## 2018-04-20 NOTE — DIAGNOSTIC IMAGING REPORT
Indication: Pulmonary nodules

 

Technique: CT of the chest utilizing automated exposure control with intravenous

contrast. CT dose: Total .31 mGycm; CTDI vol 17.31 mGy

 

Comparison: Chest x-ray 4/18/2018

 

Findings: 

There are multiple subcentimeter nodules in the lungs bilaterally. These are most

pronounced in the upper lobes (for example image #12). A larger, 5 mm nodule is noted

in the middle lobe (image 29 congestion. There is linear opacity in the right lower

lobe (image 40). There is questionable medication to a dilated bronchus (series 6

image #42). There is no pleural effusion or pneumothorax.

 

Heart size is within normal limits. No pericardial effusion. There are coronary

arterial calcifications. There is no evidence of thoracic aortic aneurysm or

dissection. There is mild atherosclerotic calcification. There are multiple calcified

mediastinal lymph nodes. Thyroid is grossly unremarkable. No pathologically enlarged

lymphadenopathy.

 

There is intrahepatic and extra hepatic biliary ductal dilatation with the common

bile duct measuring up to 1.5 cm. The pancreas is only partially visualized and the

downstream obstructing mass is not entirely excluded. There is dilatation of the

pancreatic duct to 4.7 mm in the region of the pancreatic head. Dominant aorta normal

in caliber and mildly calcified. There are degenerative changes in the spine. No

acute osseous abnormality seen.

 

IMPRESSION: 

Multiple nodular densities in the upper lobes some solid and some groundglass. The

majority are subcentimeter. Findings are nonspecific and may be infectious or

inflammatory in etiology. The possibility of malignancy not entirely excluded and

term follow-up chest CT recommended within 3 months.

 

Tubular density in the right lower lobe may represent scarring or atelectasis. Focus

of mucus plugging also considered. Again, follow-up recommended.

 

Intrahepatic and extrahepatic biliary ductal dilatation with the common bile duct

measuring up to 1.5 cm. There is also some dilatation of the pancreatic duct. The

pancreas is not entirely visualized and a obstructing mass in the pancreatic

head/uncinate or at the ampulla cannot entirely be excluded. Further evaluation with

dedicated imaging of the abdomen (CT of the abdomen with contrast or MRCP)

recommended.

 

 

Findings and follow-up imaging recommendations were discussed via telephone

conversation with primary physician Dr. Macedo at approximately 19:00 on 4/20/2018.

 

The CT scanner at [facility] is accredited by the [employer] College of

[employer]iology and the scans are performed using protocols designed to limit radiation

exposure to as low as reasonably achievable to attain images of sufficient resolution

adequate for diagnostic evaluation.

## 2018-04-20 NOTE — PULMONOLOGY PROGRESS NOTE
Assessment/Plan


Problems:  


(1) Costochondritis


(2) ACS (acute coronary syndrome)


(3) Pulmonary nodule


(4) Chest pain


(5) History of hypertension


Assessment/Plan


cardio note reviewed


symptomatic treatment


f/u by outpatient physician


antiinflammatory





Subjective


ROS Limited/Unobtainable:  No


Interval Events:  feeling better


Constitutional:  Reports: no symptoms


HEENT:  Repors: no symptoms


Allergies:  


Coded Allergies:  


     IBUPROFEN (Verified  Allergy, Unknown, 4/18/18)





Objective





Last 24 Hour Vital Signs








  Date Time  Temp Pulse Resp B/P (MAP) Pulse Ox O2 Delivery O2 Flow Rate FiO2


 


4/20/18 11:51 97.3 72 19 107/49 99 Room Air  





 97.3       


 


4/20/18 09:23    97/40    


 


4/20/18 08:00 97.6 78 19 81/48 96 Nasal Cannula 2.0 





 97.6       


 


4/20/18 08:00  79      


 


4/20/18 04:00  61      


 


4/20/18 04:00 97.1 68 20 99/51 100 Nasal Cannula 2.0 





 97.1       


 


4/20/18 00:00 97.3 68 21 105/66 100 Nasal Cannula 2.0 





 97.3       


 


4/20/18 00:00  58      


 


4/19/18 21:15  69  175/96    


 


4/19/18 21:14  69  175/96    


 


4/19/18 20:33  69      


 


4/19/18 20:00 97.0 80 20 174/72 99 Nasal Cannula 2.0 





 97.0       


 


4/19/18 19:59     97 Nasal Cannula 2.0 28


 


4/19/18 19:59  87 20   Nasal Cannula 2.0 28


 


4/19/18 19:59      Nasal Cannula 2.0 28


 


4/19/18 19:54    175/96    


 


4/19/18 16:00 97.3 76 19 149/73 97 Nasal Cannula 2.0 





 97.3 76      


 


4/19/18 15:47  76      


 


4/19/18 14:25  64 20   Nasal Cannula 2.0 


 


4/19/18 14:23      Nasal Cannula 2.0 


 


4/19/18 14:22     97 Nasal Cannula 2.0 

















Intake and Output  


 


 4/19/18 4/20/18





 19:00 07:00


 


Intake Total 320 ml 


 


Balance 320 ml 


 


  


 


Intake Oral 320 ml 


 


# Voids 2 1


 


# Bowel Movements 2 








General Appearance:  WD/WN


HEENT:  normocephalic, atraumatic


Respiratory/Chest:  chest wall non-tender, lungs clear


Breasts:  no masses


Cardiovascular:  normal peripheral pulses


Abdomen:  normal bowel sounds, soft, non tender, no scars


Genitourinary:  normal external genitalia


Skin:  no rash


Neurologic/Psychiatric:  CNs II-XII grossly normal


Lymphatic:  no neck adenopathy


Laboratory Tests


4/20/18 05:40: 


White Blood Count 10.7, Red Blood Count 3.52L, Hemoglobin 11.5L, Hematocrit 

33.0L, Mean Corpuscular Volume 94, Mean Corpuscular Hemoglobin 32.8H, Mean 

Corpuscular Hemoglobin Concent 34.9, Red Cell Distribution Width 11.2L, 

Platelet Count 131L, Mean Platelet Volume 7.9, Neutrophils (%) (Auto) , 

Lymphocytes (%) (Auto) , Monocytes (%) (Auto) , Eosinophils (%) (Auto) , 

Basophils (%) (Auto) , Differential Total Cells Counted 100, Neutrophils % (

Manual) 31L, Lymphocytes % (Manual) 62H, Monocytes % (Manual) 5, Eosinophils % (

Manual) 2, Basophils % (Manual) 0, Band Neutrophils 0, Platelet Estimate 

DecreasedL, Platelet Morphology Normal, Anisocytosis 1+, Sodium Level 138, 

Potassium Level 4.1, Chloride Level 100, Carbon Dioxide Level 30, Anion Gap 8, 

Blood Urea Nitrogen 30H, Creatinine 1.3, Estimat Glomerular Filtration Rate , 

Glucose Level 96, Calcium Level 8.9, Troponin I 0.002





Current Medications








 Medications


  (Trade)  Dose


 Ordered  Sig/Ludin


 Route


 PRN Reason  Start Time


 Stop Time Status Last Admin


Dose Admin


 


 Acetaminophen


  (Tylenol)  650 mg  Q4H  PRN


 ORAL


 FEVER (temp>100.5F)  4/18/18 17:15


 5/18/18 17:14   


 


 


 Albuterol/


 Ipratropium


  (Albuterol/


 Ipratropium)  3 ml  Q4H  PRN


 HHN


 Shortness of Breath  4/18/18 17:15


 4/23/18 17:14   


 


 


 Brimonidine


 Tartrate


  (Alphagan)  1 drop  Q8HR


 BOTH EYES


   4/19/18 22:00


 5/19/18 21:59  4/20/18 05:59


 


 


 Diltiazem HCl


  (Cardizem)  10 mg  Q1H  PRN


 IV


 heart rate more than 120,   4/18/18 17:15


 5/18/18 17:14   


 


 


 Enalaprilat


  (Vasotec)  2.5 mg  Q6H  PRN


 IV


 sbp more than 160  4/18/18 17:15


 5/18/18 17:14  4/19/18 19:54


 


 


 Heparin Sodium


  (Porcine)


  (Heparin 5000


 units/ml)  5,000 units  EVERY 12  HOURS


 SUBQ


   4/18/18 21:00


 5/18/18 20:59  4/20/18 08:32


 


 


 Lorazepam


  (Ativan)  1 mg  Q6H  PRN


 ORAL


 For Anxiety  4/19/18 13:00


 4/26/18 12:59   


 


 


 Meclizine HCl


  (Antivert)  25 mg  Q6H  PRN


 ORAL


 for dizziness  4/19/18 14:30


 5/19/18 14:29  4/19/18 14:32


 


 


 Morphine Sulfate


  (Morphine


 Sulfate)  2 mg  Q4H  PRN


 IVP


 Severe Pain (Pain Scale 7-10)  4/19/18 07:00


 4/25/18 17:14   


 


 


 Nitroglycerin


  (Ntg)  0.4 mg  Q5M  PRN


 SL


 Prn Chest Pain  4/18/18 17:15


 5/18/18 17:14   


 


 


 Ondansetron HCl


  (Zofran)  4 mg  Q6H  PRN


 IVP


 Nausea & Vomiting  4/18/18 17:15


 5/18/18 17:14   


 


 


 Patient Own


 Medication


  (Patient's Own


 Med)  1 ea  BEDTIME


 BOTH EYES


   4/19/18 21:00


 5/19/18 20:59 UNV  


 


 


 Polyethylene


 Glycol


  (Miralax)  17 gm  DAILYPRN  PRN


 ORAL


 Constipation  4/18/18 17:15


 5/18/18 17:14   


 


 


 Sodium Chloride  1,000 ml @ 


 100 mls/hr  Q10H


 IV


   4/20/18 09:30


 5/20/18 09:29  4/20/18 09:58


 


 


 Temazepam


  (Restoril)  15 mg  HSPRN  PRN


 ORAL


 Insomnia  4/18/18 17:15


 4/25/18 17:14  4/20/18 00:05


 


 


 Timolol Maleate


  (Timoptic 0.5%


 Op Soln)  1 drop  TWICE A  DAY


 BOTH EYES


   4/19/18 19:30


 5/19/18 19:29  4/20/18 08:27


 

















Thelma Bryson MD Apr 20, 2018 12:16

## 2018-04-20 NOTE — GENERAL PROGRESS NOTE
Assessment/Plan


Assessment/Plan


Anxiety disorder.








PLAN:


1. We will start the patient on Ativan p.r.n.


2. The son-in-law stated that he would like morphine to be stopped.  I


redirected her to speak to the charge nurse





Subjective


Date patient seen:  Apr 20, 2018


Neurologic/Psychiatric:  Reports: anxiety, depressed, emotional problems


Allergies:  


Coded Allergies:  


     IBUPROFEN (Verified  Allergy, Unknown, 4/18/18)





Objective





Last 24 Hour Vital Signs








  Date Time  Temp Pulse Resp B/P (MAP) Pulse Ox O2 Delivery O2 Flow Rate FiO2


 


4/20/18 12:00  73      


 


4/20/18 11:51 97.3 72 19 107/49 99 Room Air  





 97.3       


 


4/20/18 10:21      Room Air  


 


4/20/18 10:20     98 Room Air  


 


4/20/18 10:19  72 18   Room Air  


 


4/20/18 09:23    97/40    


 


4/20/18 08:00 97.6 78 19 81/48 96 Nasal Cannula 2.0 





 97.6       


 


4/20/18 08:00  79      


 


4/20/18 04:00  61      


 


4/20/18 04:00 97.1 68 20 99/51 100 Nasal Cannula 2.0 





 97.1       


 


4/20/18 00:00 97.3 68 21 105/66 100 Nasal Cannula 2.0 





 97.3       


 


4/20/18 00:00  58      


 


4/19/18 21:15  69  175/96    


 


4/19/18 21:14  69  175/96    


 


4/19/18 20:33  69      


 


4/19/18 20:00 97.0 80 20 174/72 99 Nasal Cannula 2.0 





 97.0       


 


4/19/18 19:59     97 Nasal Cannula 2.0 28


 


4/19/18 19:59  87 20   Nasal Cannula 2.0 28


 


4/19/18 19:59      Nasal Cannula 2.0 28


 


4/19/18 19:54    175/96    


 


4/19/18 16:00 97.3 76 19 149/73 97 Nasal Cannula 2.0 





 97.3 76      


 


4/19/18 15:47  76      


 


4/19/18 14:25  64 20   Nasal Cannula 2.0 


 


4/19/18 14:23      Nasal Cannula 2.0 


 


4/19/18 14:22     97 Nasal Cannula 2.0 

















Intake and Output  


 


 4/19/18 4/20/18





 19:00 07:00


 


Intake Total 320 ml 


 


Balance 320 ml 


 


  


 


Intake Oral 320 ml 


 


# Voids 2 1


 


# Bowel Movements 2 








Laboratory Tests


4/20/18 05:40: 


White Blood Count 10.7, Red Blood Count 3.52L, Hemoglobin 11.5L, Hematocrit 

33.0L, Mean Corpuscular Volume 94, Mean Corpuscular Hemoglobin 32.8H, Mean 

Corpuscular Hemoglobin Concent 34.9, Red Cell Distribution Width 11.2L, 

Platelet Count 131L, Mean Platelet Volume 7.9, Neutrophils (%) (Auto) , 

Lymphocytes (%) (Auto) , Monocytes (%) (Auto) , Eosinophils (%) (Auto) , 

Basophils (%) (Auto) , Differential Total Cells Counted 100, Neutrophils % (

Manual) 31L, Lymphocytes % (Manual) 62H, Monocytes % (Manual) 5, Eosinophils % (

Manual) 2, Basophils % (Manual) 0, Band Neutrophils 0, Platelet Estimate 

DecreasedL, Platelet Morphology Normal, Anisocytosis 1+, Sodium Level 138, 

Potassium Level 4.1, Chloride Level 100, Carbon Dioxide Level 30, Anion Gap 8, 

Blood Urea Nitrogen 30H, Creatinine 1.3, Estimat Glomerular Filtration Rate , 

Glucose Level 96, Calcium Level 8.9, Troponin I 0.002


Height (Feet):  5


Height (Inches):  5.00


Weight (Pounds):  156


General Appearance:  no apparent distress, alert


Neurologic:  oriented x 3, responsive











Alejandro Bell M.D. Apr 20, 2018 13:27

## 2018-04-20 NOTE — PHYSICIAN QUERY
--------- THIS DOCUMENT IS A PERMANENT PART OF THE MEDICAL RECORD ---------



*****PLEASE COMPLETE THE DOCUMENT BEFORE SIGNING*****



Dear JOSHUA Jacob                       Date 04/20/18

/CDS' Name Virginia Campbell               /CDS Phone#: [phone]



Exercise your independent professional judgment when responding to query.  
Questions asked do not imply particular answer is desired or expected.  We 
greatly appreciate your clarification on this issue.



Clinical Documentation States:

H & P (04/19/18)

"Chest pain"

[facility] note:  (04/19/18) = "Chest pain, reproducible on 
palpation in the deltopectoral groove. No evidence of myonecrosis."



Clinical Findings Show: 

 EKG = Normal sinus rhythm with sinus arrhythmia. Normal ECG       Troponin = 
0.000               

 O2% =        ECHO =mild LV diastolic dysfunction (Grade I )  EF =  65-70
  %.   

________________________________________________________________________________
_______



Please document the suspected etiology of Chest Pain:



a.Type:      []Cardiac      [X]Non-cardiac        []Unspecified



b.Etiology - cardiac    

   [] Aortic dissection                    []Mitral valve prolapsed      

   [] Acute myocardial infarction               []Spasm of coronary arteries   

   [] Coronary Artery Disease                    []Pericarditis   



c.Etiology - non-cardiac

   [] Anxiety                         []Pleurisy

   [] Cancer                          []Pneumonia, type _____________ 

   [] Costochondritis              []Pneumothorax      

   [] GERD/Esophagitis          []Pulmonary embolism   

   [X] Unable to determine   

   []Other:___________________



Condition Present on Admission:       [X] Yes                      [] No       
      []Clinically Undeterminable



Please also document in your Progress Notes and/or Discharge Summary and 
indicate if the condition was present on admission.



_____________________           _______________

Dr. JOSHUA RAMIREZ                       Date/Time
[place]

## 2018-04-20 NOTE — CARDIOLOGY REPORT
--------------- APPROVED REPORT --------------





EXAM: Two-dimensional and M-mode echocardiogram with Doppler and color 

Doppler.



INDICATION

LV FUNCTION 



M-Mode DIMENSIONS 

IVSd1.4 (0.7-1.1cm)Left Atrium (MM)3.6 (1.6-4.0cm)

LVDd4.5 (3.5-5.6cm)Aortic Root3.0 (2.0-3.7cm)

PWd1.3 (0.7-1.1cm)Aortic Cusp Exc.1.7 (1.5-2.0cm)

IVSs2.6 cm

LVDs3.0 (2.5-4.0cm)

PWs1.4 cm





Normal left ventricular chamber size, systolic function and wall motion .

Left ventricular ejection fraction estimated to be 65-70  %.

Mild left ventricular hypertrophy by 2-D.

No evidence of pericardial effusion. 

All other cardiac chamber sizes are within normal limits. 

Focal aortic valve sclerosis with adequate cusp excursion.

Mildly  Thickened mitral valve leaflets with normal excursion.

Mildly Mitral annulus and aortic root calcification.

Pulmonic valve not well visualized.

Normal tricuspid valve structure. 

IVC at normal size with physiological collapse .



A  color flow and spectral Doppler study was performed and revealed:

Mild  aortic regurgitation.

Trace to mild  mitral regurgitation.

Mitral diastolic velocities suggest reduced left ventricular relaxation c/w mild LV 

diastolic dysfunction (Grade I ). 

Mild  tricuspid regurgitation.

Tricuspid  systolic velocities suggests peak right ventricular systolic pressure of 36, 

consistent with mild hypertension.

## 2018-04-20 NOTE — DISCHARGE SUMMARY
Discharge Summary


Hospital Course


Date of Admission


Apr 18, 2018 at 14:40


Date of Discharge


Apr 20, 2018 at 14:40


Admitting Diagnosis


ACUTE CORONARY SYNDROME


HPI


Emily Jaime is a 88 year old female who was admitted on Apr 18, 2018 at 14:

40 for Acute Coronady Syndrome


Hospital Course


[id number]





Discharge


Discharge Disposition


Patient was discharged to Home (01)











Sammi Damon NP Apr 20, 2018 17:18

## 2018-04-20 NOTE — CONSULTATION
DATE OF CONSULTATION:  04/19/2018



CARDIOLOGY CONSULTATION



CONSULTING PHYSICIAN:  Amilcar Bell M.D.



REFERRING PHYSICIAN:  Thelma Bryson M.D.



REASON FOR REFERRAL:  Chest pain.



HISTORY OF PRESENT ILLNESS:  This is an 88-year-old female, the patient's

information was obtained from review of the [facility] records as well

as from my discussion with her.  The patient recently was hospitalized and

discharged at [facility] where she presented previously

approximately two to three weeks ago with complaints of chest pain.

Myocardial infarction was excluded.  The patient underwent a CT pulmonary

angiography _____ pulmonary embolism and dissection and the patient was

discharged.  Her chest pain apparently was reproducible on palpation of

the chest wall.  Nevertheless, she was discharged.  She apparently saw her

cardiologist, not sure if there was anything done.  She herself does not

remember having chest pain that information obtained from the [facility]

records.  Nevertheless, she thinks that she had presented to the emergency

room for elevated blood pressure.  In addition _____ she came because of

elevated blood pressure.  She came in because of chest pains too

apparently as I understand it.  The pain has been present since yesterday

morning constantly in the left deltopectoral groove.  It is tender to

palpation.  She has not identified any relieving or exacerbating factors,

but the pain is not relieved since.  She does not seem to think the pain

gets worse with coughing or taking a deep breath or movement of her left

arm.  She has not really tried to get out of bed to see if the pain is

reproducible or exacerbated by those factors.  The patient does not have

any PND and uses two pillows at home.  There is no dizziness or

lightheadedness on standing on usual basis.



PAST MEDICAL HISTORY:  Positive for history of hypertension for which she

has previously been discharged from [facility] with Diovan /12.5 mg

every 12 hours, Norvasc 10 mg a day, Coreg 3.125 mg twice a day.  She also

has gastroesophageal reflux for which she is on Zantac.  She has CLL

followed by Dr. Hanks.  She has insomnia.  She has had degenerative

joint disease, carpal tunnel release, hysterectomy and cholecystectomy.



ALLERGIES:  She is allergic to morphine and acetaminophen according to the

chart.



SOCIAL HISTORY:  She does not drink, does not use drugs and no smoking.



FAMILY HISTORY:  Father with lung cancer.  No known history of heart

disease according to family.



REVIEW OF SYSTEMS:  GASTROINTESTINAL:  She has had some nausea and she

vomited 2 days ago, but she has been feeling nauseated today.  There is no

black or tarry stools.  GENITOURINARY:  Negative.  PULMONARY:  Negative.

CONSTITUTIONAL:  Negative.  NEUROLOGICAL:  Negative.



PHYSICAL EXAMINATION:

GENERAL:  Shows to be morbidly obese elderly female, in no respiratory

distress, lying down approximately 30 to 40 degrees head of bed

elevation.

VITAL SIGNS:  Her blood pressure is anywhere between 124/64 to 149/73.

NECK:  Supple.  No jugular venous distention.

LUNGS:  Clear to auscultation and percussion.

CARDIAC:  S1 is normal.  S2 is normal.  Regular rate and rhythm.  No

heaves, thrills, or gallops noted.  Chest wall has tenderness in the

deltopectoral groove area.

ABDOMEN:  Soft.  Obese.  Positive bowel sounds.  Nontender.

EXTREMITIES:  There is no clubbing, cyanosis, nor is there any

edema.

NEUROLOGICAL:  She is awake, alert, responsive, and in no respiratory

distress whatsoever.



LABORATORY AND DIAGNOSTIC DATA:  Her labs, sodium 138, potassium 3.4,

chloride 101, bicarbonate 31, BUN 6, creatinine 0.9 and glucose 109.

Liver function tests are all normal.  Three sets of cardiac enzymes all

were negative.  CRP less than 0.4.  ProBNP of 362. Total cholesterol 225

and LDL of 147.  TSH is 0.59.  Lipase of 321, normal for this hospital.

INR is 1.0 and PTT of 23.  She has had a chest x-ray performed here with

no acute processes except for right sided pulmonary nodule density

postinflammatory noted.  [facility] records have been reviewed.  Chest x-ray

showed normal sinus rhythm.  No ST or T-wave abnormalities in direct

comparison with the EKG performed at [facility] on 03/13/2018, does not

appear to be significantly changed at all.  Of note, she has had a CT scan

at [facility] as mentioned just recently that showed no evidence of

pulmonary embolism, numerous small bilateral pulmonary nodules,

ground-glass opacities in the lungs unchanged since 2013 with possibility

of a mucous plugging on the right, old granulomatous disease and biliary

and pancreatic ductal dilatation seen on MR angiogram of 02/10/2018 and

she has stable noncalcified borderline enlarged mediastinal and hilar

lymph nodes.



ASSESSMENT AND PLAN:

1. Chest pain, reproducible on palpation in the deltopectoral groove.

No evidence of myonecrosis.

2. Hypertension, controlled.

3. History of numerous bilateral pulmonary nodules, unchanged since

04/2013.  CT scans are to be post inflammatory benign.

4. Obesity.

5. CLL.



Dr. Bryson, this patient was seen in cardiac consultation.  The patient

has no evidence of myonecrosis.  EKG is unremarkable.  She has had an

echocardiogram that showed normal left ventricular systolic function and

no wall motion abnormalities.  A chest x-ray is unremarkable.  She has had

extensive workup at [facility].  At some point, as outpatient she may qualify

to have stress testing performed.  She has her own cardiologist apparently

that follows on a regular basis and she may do that through that

cardiologist.  I see no urgency in performing this stress test here as the

patient's pain is reproducible on palpation in the deltopectoral groove

with no evidence of myonecrosis.









  ______________________________________________

  Amilcar Bell M.D.





DR:  BRAD

D:  04/19/2018 20:00

T:  04/20/2018 00:39

JOB#:  [id number]

CC:

## 2018-04-21 NOTE — DISCHARGE SUMMARY 2 SIG
DATE OF ADMISSION:  04/18/2018



DATE OF DISCHARGE:  04/20/2018



CONSULTANTS:

1. Thelma Bryson M.D.

2. Alejandro Bell M.D.

3. Amilcar Bell M.D.



BRIEF HOSPITAL COURSE:  The patient is an 88-year-old white female, who

presented with chief complaint of dizziness and chest pain.  Symptoms

started a month ago where she was admitted to [facility] and had a

stress test done.  Three days prior to admission, she began to experience

dizziness and states dizziness gets worse with change of position.  She

also developed left-sided chest pain that radiates to the left axilla.

There were no aggravating factors.  She has medical history significant

for hypertension and cerebrovascular accident in 1990.  On evaluation at

ED, initial troponin was negative.  Chest x-ray showed no acute disease.

EKG was in normal sinus rhythm.  There were no acute ST-T-wave changes

noted.  She was then admitted for evaluation of chest pain and dizziness.

She was followed by Dr. Bell.  EKG was unremarkable.  Chest pain was

reproducible on palpation of the deltopectoral groove.  There was no

evidence of myonecrosis.  She was given Coreg and Norvasc.  She was placed

on anti-inflammatories to help with the pain.  She had anxiety disorder

and was placed on p.r.n. Ativan.  She was recommended outpatient stress

test and was eventually cleared for discharge home.  Troponins were

negative.  Echocardiogram done showed ejection fraction of 65% to 70% with

normal left ventricular size, function, and wall motion.  RVSP of 36.



FINAL DIAGNOSES:

1. Costochondritis.

2. Hypertension.

3. Anxiety.

4. Bilateral pulmonary nodules, unchanged.

5. Obesity.

6. Chronic lymphocytic leukemia.



DISPOSITION:  The patient was discharged home.



DISCHARGE MEDICATIONS:  Refer to medication list.



DISCHARGE INSTRUCTIONS:  Follow up with PCP in a week.







  ______________________________________________

  Thelma Bryson M.D.



I have been assigned to dictate discharge summary on this account and I

was not involved in the patient's management.



  ______________________________________________

  Sammi Damon N.P.





DR:  Aamir

D:  04/20/2018 17:17

T:  04/21/2018 00:20

JOB#:  [id number]

CC: